# Patient Record
Sex: FEMALE | Employment: FULL TIME | URBAN - METROPOLITAN AREA
[De-identification: names, ages, dates, MRNs, and addresses within clinical notes are randomized per-mention and may not be internally consistent; named-entity substitution may affect disease eponyms.]

---

## 2018-03-06 ENCOUNTER — OFFICE VISIT (OUTPATIENT)
Dept: PODIATRY | Facility: CLINIC | Age: 58
End: 2018-03-06
Payer: COMMERCIAL

## 2018-03-06 VITALS — BODY MASS INDEX: 23.95 KG/M2 | HEIGHT: 68 IN | WEIGHT: 158 LBS | RESPIRATION RATE: 18 BRPM

## 2018-03-06 DIAGNOSIS — B35.1 ONYCHOMYCOSIS: Primary | ICD-10-CM

## 2018-03-06 PROCEDURE — 99203 OFFICE O/P NEW LOW 30 MIN: CPT | Performed by: PODIATRIST

## 2018-03-06 RX ORDER — VALSARTAN 80 MG/1
80 TABLET ORAL
COMMUNITY

## 2018-03-06 RX ORDER — DEXTROAMPHETAMINE SACCHARATE, AMPHETAMINE ASPARTATE, DEXTROAMPHETAMINE SULFATE AND AMPHETAMINE SULFATE 1.25; 1.25; 1.25; 1.25 MG/1; MG/1; MG/1; MG/1
TABLET ORAL
Refills: 0 | COMMUNITY
Start: 2017-09-29 | End: 2018-03-06

## 2018-03-06 RX ORDER — NADOLOL 20 MG/1
20 TABLET ORAL
COMMUNITY
Start: 2017-05-04

## 2018-03-06 RX ORDER — NADOLOL 20 MG/1
TABLET ORAL
Refills: 11 | COMMUNITY
Start: 2018-01-22 | End: 2018-03-06

## 2018-03-06 RX ORDER — HYDROCHLOROTHIAZIDE 25 MG/1
TABLET ORAL
COMMUNITY
Start: 2018-01-15

## 2018-03-06 RX ORDER — CIPROFLOXACIN 500 MG/1
TABLET, FILM COATED ORAL
Refills: 0 | COMMUNITY
Start: 2018-01-21 | End: 2018-03-06

## 2018-03-06 RX ORDER — TERBINAFINE HYDROCHLORIDE 250 MG/1
TABLET ORAL
COMMUNITY
Start: 2017-04-11

## 2018-03-06 RX ORDER — PANTOPRAZOLE SODIUM 40 MG/1
TABLET, DELAYED RELEASE ORAL
Refills: 3 | COMMUNITY
Start: 2018-02-13 | End: 2018-03-06

## 2018-03-06 RX ORDER — ALPRAZOLAM 0.5 MG
0.5 TABLET ORAL
COMMUNITY

## 2018-03-06 RX ORDER — VALSARTAN AND HYDROCHLOROTHIAZIDE 80; 12.5 MG/1; MG/1
TABLET, FILM COATED ORAL
Refills: 0 | COMMUNITY
Start: 2017-09-20 | End: 2018-03-06

## 2018-03-06 RX ORDER — PANTOPRAZOLE SODIUM 40 MG/1
40 TABLET, DELAYED RELEASE ORAL
COMMUNITY
Start: 2017-01-11

## 2018-03-06 RX ORDER — ESTRADIOL 0.04 MG/D
1 PATCH, EXTENDED RELEASE TRANSDERMAL
COMMUNITY
Start: 2018-02-01 | End: 2018-03-06

## 2018-03-06 RX ORDER — IVERMECTIN 10 MG/G
CREAM TOPICAL
COMMUNITY
Start: 2017-08-16

## 2018-03-06 RX ORDER — ZOLPIDEM TARTRATE 5 MG/1
TABLET ORAL
Refills: 0 | COMMUNITY
Start: 2018-01-19 | End: 2018-03-06

## 2018-03-06 RX ORDER — VALSARTAN 80 MG/1
TABLET ORAL
Refills: 3 | COMMUNITY
Start: 2018-02-15 | End: 2018-03-06

## 2018-03-06 RX ORDER — IVERMECTIN 10 MG/G
CREAM TOPICAL
Refills: 11 | COMMUNITY
Start: 2017-08-16

## 2018-03-06 RX ORDER — ZOLPIDEM TARTRATE 5 MG/1
TABLET ORAL
COMMUNITY
Start: 2017-01-06

## 2018-03-06 RX ORDER — HYDROCHLOROTHIAZIDE 25 MG/1
TABLET ORAL
Refills: 3 | COMMUNITY
Start: 2018-02-13 | End: 2018-03-06

## 2018-03-06 RX ORDER — CLINDAMYCIN PHOSPHATE 10 MG/G
GEL TOPICAL
Refills: 11 | COMMUNITY
Start: 2017-08-16 | End: 2018-03-06

## 2018-03-06 RX ORDER — CICLOPIROX 80 MG/ML
SOLUTION TOPICAL
Qty: 6.6 ML | Refills: 5 | Status: SHIPPED | OUTPATIENT
Start: 2018-03-06

## 2018-03-06 RX ORDER — ALPRAZOLAM 0.5 MG
TABLET ORAL
Refills: 0 | COMMUNITY
Start: 2018-01-19 | End: 2018-03-06

## 2018-03-06 RX ORDER — CLINDAMYCIN PHOSPHATE 10 MG/G
GEL TOPICAL
COMMUNITY
Start: 2017-08-16 | End: 2018-03-06

## 2018-03-06 RX ORDER — ESTRADIOL 0.04 MG/D
FILM, EXTENDED RELEASE TRANSDERMAL
Refills: 3 | COMMUNITY
Start: 2018-02-20

## 2018-03-06 NOTE — NURSING NOTE
"Chief Complaint   Patient presents with     Foot Problems     Bl foot nail fungus x 1.5 years, lamisil made her sick       Initial Resp 18  Ht 5' 8\" (1.727 m)  Wt 158 lb (71.7 kg)  BMI 24.02 kg/m2 Estimated body mass index is 24.02 kg/(m^2) as calculated from the following:    Height as of this encounter: 5' 8\" (1.727 m).    Weight as of this encounter: 158 lb (71.7 kg).  Medication Reconciliation: complete    Baldev Nguyen CMA (AAMA)  Podiatry / Foot & Ankle Surgery  [facility]    "

## 2018-03-06 NOTE — PATIENT INSTRUCTIONS
Thank you for choosing [facility]!    DR. NIETO'S CLINIC LOCATIONS:   MONDAY - [place] TUESDAY - [place]   [address] [facility]  [address]   [place] [address] [place] [address]   [phone]       THURSDAY AM - [place] THURSDAY PM - [place]   [address]   [place] [address] [place], [place] [address] [phone]       FRIDAY AM - [place] SET UP SURGERY: [phone] [address] APPOINTMENTS: [phone]   [place] [address] BILLING QUESTIONS: [phone] FAX NUMBER: [phone]     Follow Up: as needed    NAIL FUNGUS / ONYCHOMYCOSIS   Nail fungus is not a hygiene problem and will not likely lead to significant medical   problems. The nails may get thick causing pain and possibly local skin infection.   Treatments include debridement (trimming), oral antifungals, topical antifungals and complete removal of the nail. Most fungal nails are not treated.   Topicals such as tea tree oil can be helpful for surface fungus and may, at best, limit   progression. Over the counter creams (such as Lamisil) can also be used however, their effectiveness is also quite low.  Topical treatment with Pen lac is expensive and often not covered by insurance. Pen lac has an approximate 8% success rate. Topical therapy recommendations is to apply twice a day for at least 3-4 months as it takes 9 months for new nail to grow out.    Experts suggest soaking your feet for 15 to 20 minutes in a mixture of 1 cup vinegar to 4 cups warm water. Be sure to rinse well and pat your feet dry when you're done. You can soak your feet like this daily. But if your skin becomes irritated, try soaking only two to three times a week. Vicks VapoRub, as with vinegar, there have been no controlled clinical trials to assess the effectiveness of Vicks VapoRub on nail fungus, but there have been numerous anecdotal reports  that it works. There's no consensus on how often to apply this product, so check with your doctor before using it on your nails.     Oral therapies include Sporanox and Lamisil. Oral therapies are also expensive and not very effective. Side effects such as liver disease are the main concern. Return of fungus is common even if the treatment worked.     Other Tips:  - Penlac nail medication apply daily x 4 months; remove old polish first day of each week  - Antifungal cream/powder (Zeasorb) - apply daily to feet and shoes x 2 months  - Clean shoes with Lysol or in washing machine every few weeks  - Rotate shoe gear; give them 24 hours to dry out between days wearing them  - Clean pair of socks in morning, clean pair in afternoon if your feet sweat  - Shower shoes used in public showers/pools      Body Mass Index (BMI)  Many things can cause foot and ankle problems. Foot structure, activity level, foot mechanics and injuries are common causes of pain. One very important issue that often goes unmentioned, is body weight. Extra weight can cause increased stress on muscles, ligaments, bones and tendons. Sometimes just a few extra pounds is all it takes to put one over her/his threshold. Without reducing that stress, it can be difficult to alleviate pain. Some people are uncomfortable addressing this issue, but we feel it is important for you to think about it. As Foot &  Ankle specialists, our job is addressing the lower extremity problem and possible causes. Regarding extra body weight, we encourage patients to discuss diet and weight management plans with their primary care doctors. It is this team approach that gives you the best opportunity for pain relief and getting you back on your feet.

## 2018-03-06 NOTE — PROGRESS NOTES
"Foot & Ankle Surgery  March 6, 2018    CC: toe fungus    I was asked to see Saida MONDRAGON Tolubora regarding the chief complaint by:  self    HPI:  Pt is a 57 year old female who presents with above complaint.  Toe fungus R>L x 1 1/2 years.  \"treatments that's tolerable\".  No pain noted.  She has tried Lamisil but it mad her sick.  + culture R hallux Feb 2017 but Lamisil therapy made her nauseous.  She also had an injury to the R hallux 20-30 years ago.  She previously had good success with antifungal treatment other than side effects.    ROS:   Pos for CC.  The patient denies current nausea, vomiting, chills, fevers, belly pain, calf pain, chest pain or SOB.  Complete remainder of ROS is otherwise neg.    VITALS:    Vitals:    03/06/18 1429   Resp: 18   Weight: 158 lb (71.7 kg)   Height: 5' 8\" (1.727 m)       PMH:  No past medical history on file.    SXHX:  No past surgical history on file.     MEDS:    Current Outpatient Prescriptions   Medication     SOOLANTRA 1 % cream     MINIVELLE 0.0375 MG/24HR BIW patch     zolpidem (AMBIEN) 5 MG tablet     valsartan (DIOVAN) 80 MG tablet     pantoprazole (PROTONIX) 40 MG EC tablet     hydrochlorothiazide (HYDRODIURIL) 25 MG tablet     nadolol (CORGARD) 20 MG tablet     ivermectin (SOOLANTRA) 1 % cream     ALPRAZolam (XANAX) 0.5 MG tablet     terbinafine (LAMISIL) 250 MG tablet     [DISCONTINUED] valsartan (DIOVAN) 80 MG tablet     [DISCONTINUED] nadolol (CORGARD) 20 MG tablet     [DISCONTINUED] hydrochlorothiazide (HYDRODIURIL) 25 MG tablet     [DISCONTINUED] estradiol (VIVELLE-DOT) 0.0375 MG/24HR BIW patch     No current facility-administered medications for this visit.        ALL:   Not on File    FMH:  No family history on file.    SocHx:    Social History     Social History     Marital status:      Spouse name: N/A     Number of children: N/A     Years of education: N/A     Occupational History     Not on file.     Social History Main Topics     Smoking status: Unknown If " Ever Smoked     Smokeless tobacco: Never Used     Alcohol use Not on file     Drug use: Not on file     Sexual activity: Not on file     Other Topics Concern     Not on file     Social History Narrative     No narrative on file           EXAMINATION:  Gen:   No apparent distress  Neuro:   A&Ox3, no deficits  Psych:    Answering questions appropriately for age and situation with normal affect  Head:    NCAT  Eye:    Visual scanning without deficit  Ear:    Response to auditory stimuli wnl  Lung:    Non-labored breathing on RA noted  Abd:    NTND per patient report  Lymph:    Neg for pitting/non-pitting edema BLE  Vasc:    Pulses palpable, CFT minimally delayed  Neuro:    Light touch sensation intact to all sensory nerve distributions without paresthesias  Derm:    Hallux nail bilateral R>L are dystrophic, mycotic.  No tinea pedis noted.  MSK:    ROM, strength wnl without limitation, no pain on palpation noted.  Calf:    Neg for redness, swelling or tenderness    Assessment:  57 year old female with onychomycosis hallux nail bilateral       Plan:  Discussed etiologies, anatomy and options  1.  Onychomycosis hallux nail bilateral   -Rx for Penlac with instructions  -home fungus handout dispensed and discussed   -discussed that nail fungus is difficult to treat and eradicate.  Treating fungus at multiple locations, from nails to skin to shoes, etc, will increase chance of successful treatment.    Follow up:  prn or sooner with acute issues      Patient's medical history was reviewed today    Body mass index is 24.02 kg/(m^2).          Christian Dorado DPM   Podiatric Foot & Ankle Surgeon  [facility]  [phone]

## 2018-03-06 NOTE — MR AVS SNAPSHOT
After Visit Summary   3/6/2018    Saida Wilkinson    MRN: [id number]           Patient Information     Date Of Birth          1960        Visit Information        Provider Department      3/6/2018 2:30 PM Christian Nieto DPM [facility]        Today's Diagnoses     Onychomycosis    -  1      Care Instructions    Thank you for choosing [facility]!    DR. NIETO'S CLINIC LOCATIONS:   MONDAY - [place] TUESDAY - [place]   [address] [facility]  [address]   [place] [address] [place] [address]   [phone]       THURSDAY AM - [place] THURSDAY PM - [place]   [address]   [place] [address] [place], [place] [address] [phone]       FRIDAY AM - [place] SET UP SURGERY: [phone] [address] APPOINTMENTS: [phone]   [place] [address] BILLING QUESTIONS: [phone] FAX NUMBER: [phone]     Follow Up: as needed    NAIL FUNGUS / ONYCHOMYCOSIS   Nail fungus is not a hygiene problem and will not likely lead to significant medical   problems. The nails may get thick causing pain and possibly local skin infection.   Treatments include debridement (trimming), oral antifungals, topical antifungals and complete removal of the nail. Most fungal nails are not treated.   Topicals such as tea tree oil can be helpful for surface fungus and may, at best, limit   progression. Over the counter creams (such as Lamisil) can also be used however, their effectiveness is also quite low.  Topical treatment with Pen lac is expensive and often not covered by insurance. Pen lac has an approximate 8% success rate. Topical therapy recommendations is to apply twice a day for at least 3-4 months as it takes 9 months for new nail to grow out.    Experts suggest soaking your feet for 15 to 20 minutes in a mixture of 1 cup vinegar to 4 cups warm water. Be sure to rinse  well and pat your feet dry when you're done. You can soak your feet like this daily. But if your skin becomes irritated, try soaking only two to three times a week. Vicks VapoRub, as with vinegar, there have been no controlled clinical trials to assess the effectiveness of Vicks VapoRub on nail fungus, but there have been numerous anecdotal reports that it works. There's no consensus on how often to apply this product, so check with your doctor before using it on your nails.     Oral therapies include Sporanox and Lamisil. Oral therapies are also expensive and not very effective. Side effects such as liver disease are the main concern. Return of fungus is common even if the treatment worked.     Other Tips:  - Penlac nail medication apply daily x 4 months; remove old polish first day of each week  - Antifungal cream/powder (Zeasorb) - apply daily to feet and shoes x 2 months  - Clean shoes with Lysol or in washing machine every few weeks  - Rotate shoe gear; give them 24 hours to dry out between days wearing them  - Clean pair of socks in morning, clean pair in afternoon if your feet sweat  - Shower shoes used in public showers/pools      Body Mass Index (BMI)  Many things can cause foot and ankle problems. Foot structure, activity level, foot mechanics and injuries are common causes of pain. One very important issue that often goes unmentioned, is body weight. Extra weight can cause increased stress on muscles, ligaments, bones and tendons. Sometimes just a few extra pounds is all it takes to put one over her/his threshold. Without reducing that stress, it can be difficult to alleviate pain. Some people are uncomfortable addressing this issue, but we feel it is important for you to think about it. As Foot &  Ankle specialists, our job is addressing the lower extremity problem and possible causes. Regarding extra body weight, we encourage patients to discuss diet and weight management plans with their primary care  "doctors. It is this team approach that gives you the best opportunity for pain relief and getting you back on your feet.            Follow-ups after your visit        Who to contact     If you have questions or need follow up information about today's clinic visit or your schedule please contact [facility] directly at [phone].  Normal or non-critical lab and imaging results will be communicated to you by MyChart, letter or phone within 4 business days after the clinic has received the results. If you do not hear from us within 7 days, please contact the clinic through [employer] or phone. If you have a critical or abnormal lab result, we will notify you by phone as soon as possible.  Submit refill requests through [employer] or call your pharmacy and they will forward the refill request to us. Please allow 3 business days for your refill to be completed.          Additional Information About Your Visit        [employer] Information     [employer] lets you send messages to your doctor, view your test results, renew your prescriptions, schedule appointments and more. To sign up, go to [email or web address] . Click on \"Log in\" on the left side of the screen, which will take you to the Welcome page. Then click on \"Sign up Now\" on the right side of the page.     You will be asked to enter the access code listed below, as well as some personal information. Please follow the directions to create your username and password.     Your access code is: [email or web address]  Expires: 2018  2:50 PM     Your access code will  in 90 days. If you need help or a new code, please call your [facility] clinic or [phone].        Care EveryWhere ID     This is your Care EveryWhere ID. This could be used by other organizations to access your [facility] medical records  [id number]        Your Vitals Were     Respirations Height BMI (Body Mass Index)             18 5' 8\" (1.727 m) 24.02 kg/m2          Blood Pressure from Last 3 " Encounters:   No data found for BP    Weight from Last 3 Encounters:   03/06/18 158 lb (71.7 kg)              Today, you had the following     No orders found for display         Today's Medication Changes          These changes are accurate as of 3/6/18  2:50 PM.  If you have any questions, ask your nurse or doctor.               Start taking these medicines.        Dose/Directions    ciclopirox 8 % Soln   Used for:  Onychomycosis   Started by:  Christian Dorado DPM        Apply to involved nails daily x 12 months; remove old medication and file nails once weekly.   Quantity:  6.6 mL   Refills:  5         These medicines have changed or have updated prescriptions.        Dose/Directions    ALPRAZolam 0.5 MG tablet   Commonly known as:  XANAX   This may have changed:  Another medication with the same name was removed. Continue taking this medication, and follow the directions you see here.   Changed by:  Christian Dorado DPM        Dose:  0.5 mg   Take 0.5 mg by mouth   Refills:  0       hydrochlorothiazide 25 MG tablet   Commonly known as:  HYDRODIURIL   This may have changed:  Another medication with the same name was removed. Continue taking this medication, and follow the directions you see here.   Changed by:  Christian Dorado DPM        TK 1 T PO QD   Refills:  0       MINIVELLE 0.0375 MG/24HR BIW patch   This may have changed:  Another medication with the same name was removed. Continue taking this medication, and follow the directions you see here.   Generic drug:  estradiol   Changed by:  Christian Dorado DPM        LONA 1 PA EXT TO THE SKIN 2 TIMES A WK   Refills:  3       nadolol 20 MG tablet   Commonly known as:  CORGARD   This may have changed:  Another medication with the same name was removed. Continue taking this medication, and follow the directions you see here.   Changed by:  Christian Dorado DPM        Dose:  20 mg   Take 20 mg by mouth   Refills:  0       pantoprazole 40 MG EC  tablet   Commonly known as:  PROTONIX   This may have changed:  Another medication with the same name was removed. Continue taking this medication, and follow the directions you see here.   Changed by:  Christian Dorado DPM        Dose:  40 mg   Take 40 mg by mouth   Refills:  0       valsartan 80 MG tablet   Commonly known as:  DIOVAN   This may have changed:  Another medication with the same name was removed. Continue taking this medication, and follow the directions you see here.   Changed by:  Christian Dorado DPM        Dose:  80 mg   Take 80 mg by mouth   Refills:  0       zolpidem 5 MG tablet   Commonly known as:  AMBIEN   This may have changed:  Another medication with the same name was removed. Continue taking this medication, and follow the directions you see here.   Changed by:  Chirstian Dorado DPM        TK 1 T PO QHS   Refills:  0         Stop taking these medicines if you haven't already. Please contact your care team if you have questions.     amphetamine-dextroamphetamine 5 MG per tablet   Commonly known as:  ADDERALL   Stopped by:  Christian Dorado DPM           ciprofloxacin 500 MG tablet   Commonly known as:  CIPRO   Stopped by:  Christian Dorado DPM           clindamycin 1 % topical gel   Commonly known as:  CLINDAMAX   Stopped by:  Christian Dorado DPM           valsartan-hydrochlorothiazide 80-12.5 MG per tablet   Commonly known as:  DIOVAN-HCT   Stopped by:  Christian Dorado DPM                Where to get your medicines      These medications were sent to [facility] [address] [place] [address] [address] [facility] AT [facility] [address] [facility] [address]     Phone:  [phone]     ciclopirox 8 % Soln                Primary Care Provider Fax #    Physician No Ref-Primary [phone]       No address on file        Equal Access to Services     SHANE LLOYD AH: Margie Funes, vasquez mejia, qazane kacosme ballard, jazmyn bhatia  abner ocasio ah. So [facility] [phone].    ATENCIÓN: Si bobla monique, tiene a anderson disposición servicios gratuitos de asistencia lingüística. Ruslan rendon [phone].    We comply with applicable federal civil rights laws and [place] laws. We do not discriminate on the basis of race, color, national origin, age, disability, sex, sexual orientation, or gender identity.            Thank you!     Thank you for choosing [facility]  for your care. Our goal is always to provide you with excellent care. Hearing back from our patients is one way we can continue to improve our services. Please take a few minutes to complete the written survey that you may receive in the mail after your visit with us. Thank you!             Your Updated Medication List - Protect others around you: Learn how to safely use, store and throw away your medicines at www.disposemymeds.org.          This list is accurate as of 3/6/18  2:50 PM.  Always use your most recent med list.                   Brand Name Dispense Instructions for use Diagnosis    ALPRAZolam 0.5 MG tablet    XANAX     Take 0.5 mg by mouth        ciclopirox 8 % Soln     6.6 mL    Apply to involved nails daily x 12 months; remove old medication and file nails once weekly.    Onychomycosis       hydrochlorothiazide 25 MG tablet    HYDRODIURIL     TK 1 T PO QD        MINIVELLE 0.0375 MG/24HR BIW patch   Generic drug:  estradiol      LONA 1 PA EXT TO THE SKIN 2 TIMES A WK        nadolol 20 MG tablet    CORGARD     Take 20 mg by mouth        pantoprazole 40 MG EC tablet    PROTONIX     Take 40 mg by mouth        * SOOLANTRA 1 % cream   Generic drug:  ivermectin      LONA PEA SIZED AMT EXT AA D        * ivermectin 1 % cream    SOOLANTRA          terbinafine 250 MG tablet    lamISIL          valsartan 80 MG tablet    DIOVAN     Take 80 mg by mouth        zolpidem 5 MG tablet    AMBIEN     TK 1 T PO QHS        * Notice:  This list has 2 medication(s) that are the  same as other medications prescribed for you. Read the directions carefully, and ask your doctor or other care provider to review them with you.

## 2019-11-04 ENCOUNTER — HOSPITAL ENCOUNTER (EMERGENCY)
Facility: CLINIC | Age: 59
Discharge: HOME | End: 2019-11-04
Attending: EMERGENCY MEDICINE | Admitting: EMERGENCY MEDICINE
Payer: COMMERCIAL

## 2019-11-04 ENCOUNTER — APPOINTMENT (OUTPATIENT)
Dept: CT IMAGING | Facility: CLINIC | Age: 59
End: 2019-11-04
Attending: EMERGENCY MEDICINE
Payer: COMMERCIAL

## 2019-11-04 VITALS
BODY MASS INDEX: 24.25 KG/M2 | WEIGHT: 160 LBS | TEMPERATURE: 97.6 F | HEART RATE: 72 BPM | SYSTOLIC BLOOD PRESSURE: 120 MMHG | RESPIRATION RATE: 18 BRPM | HEIGHT: 68 IN | DIASTOLIC BLOOD PRESSURE: 69 MMHG | OXYGEN SATURATION: 100 %

## 2019-11-04 DIAGNOSIS — R19.7 ACUTE DIARRHEA: ICD-10-CM

## 2019-11-04 LAB
ANION GAP SERPL CALCULATED.3IONS-SCNC: 5 MMOL/L (ref 3–14)
BASOPHILS # BLD AUTO: 0.1 10E9/L (ref 0–0.2)
BASOPHILS NFR BLD AUTO: 0.5 %
BUN SERPL-MCNC: 13 MG/DL (ref 7–30)
CALCIUM SERPL-MCNC: 8.7 MG/DL (ref 8.5–10.1)
CHLORIDE SERPL-SCNC: 104 MMOL/L (ref 94–109)
CO2 SERPL-SCNC: 28 MMOL/L (ref 20–32)
CREAT SERPL-MCNC: 0.94 MG/DL (ref 0.52–1.04)
DIFFERENTIAL METHOD BLD: NORMAL
EOSINOPHIL # BLD AUTO: 0.3 10E9/L (ref 0–0.7)
EOSINOPHIL NFR BLD AUTO: 3.2 %
ERYTHROCYTE [DISTWIDTH] IN BLOOD BY AUTOMATED COUNT: 13.1 % (ref 10–15)
GFR SERPL CREATININE-BSD FRML MDRD: 65 ML/MIN/{1.73_M2}
GLUCOSE SERPL-MCNC: 120 MG/DL (ref 70–99)
HCT VFR BLD AUTO: 37.9 % (ref 35–47)
HGB BLD-MCNC: 12.6 G/DL (ref 11.7–15.7)
IMM GRANULOCYTES # BLD: 0 10E9/L (ref 0–0.4)
IMM GRANULOCYTES NFR BLD: 0.4 %
LYMPHOCYTES # BLD AUTO: 1.1 10E9/L (ref 0.8–5.3)
LYMPHOCYTES NFR BLD AUTO: 10.6 %
MCH RBC QN AUTO: 31 PG (ref 26.5–33)
MCHC RBC AUTO-ENTMCNC: 33.2 G/DL (ref 31.5–36.5)
MCV RBC AUTO: 93 FL (ref 78–100)
MONOCYTES # BLD AUTO: 0.8 10E9/L (ref 0–1.3)
MONOCYTES NFR BLD AUTO: 7.2 %
NEUTROPHILS # BLD AUTO: 8.3 10E9/L (ref 1.6–8.3)
NEUTROPHILS NFR BLD AUTO: 78.1 %
NRBC # BLD AUTO: 0 10*3/UL
NRBC BLD AUTO-RTO: 0 /100
PLATELET # BLD AUTO: 239 10E9/L (ref 150–450)
POTASSIUM SERPL-SCNC: 3.7 MMOL/L (ref 3.4–5.3)
RBC # BLD AUTO: 4.07 10E12/L (ref 3.8–5.2)
SODIUM SERPL-SCNC: 137 MMOL/L (ref 133–144)
WBC # BLD AUTO: 10.7 10E9/L (ref 4–11)

## 2019-11-04 PROCEDURE — 96360 HYDRATION IV INFUSION INIT: CPT | Mod: 59

## 2019-11-04 PROCEDURE — 25800030 ZZH RX IP 258 OP 636: Performed by: EMERGENCY MEDICINE

## 2019-11-04 PROCEDURE — 80048 BASIC METABOLIC PNL TOTAL CA: CPT | Performed by: EMERGENCY MEDICINE

## 2019-11-04 PROCEDURE — 74177 CT ABD & PELVIS W/CONTRAST: CPT

## 2019-11-04 PROCEDURE — 25000128 H RX IP 250 OP 636: Performed by: EMERGENCY MEDICINE

## 2019-11-04 PROCEDURE — 25000125 ZZHC RX 250: Performed by: EMERGENCY MEDICINE

## 2019-11-04 PROCEDURE — 85025 COMPLETE CBC W/AUTO DIFF WBC: CPT | Performed by: EMERGENCY MEDICINE

## 2019-11-04 PROCEDURE — 99285 EMERGENCY DEPT VISIT HI MDM: CPT | Mod: 25

## 2019-11-04 RX ORDER — SODIUM CHLORIDE 9 MG/ML
1000 INJECTION, SOLUTION INTRAVENOUS CONTINUOUS
Status: DISCONTINUED | OUTPATIENT
Start: 2019-11-04 | End: 2019-11-05 | Stop reason: HOSPADM

## 2019-11-04 RX ORDER — IOPAMIDOL 755 MG/ML
500 INJECTION, SOLUTION INTRAVASCULAR ONCE
Status: COMPLETED | OUTPATIENT
Start: 2019-11-04 | End: 2019-11-04

## 2019-11-04 RX ADMIN — SODIUM CHLORIDE 1000 ML: 9 INJECTION, SOLUTION INTRAVENOUS at 20:28

## 2019-11-04 RX ADMIN — IOPAMIDOL 81 ML: 755 INJECTION, SOLUTION INTRAVENOUS at 22:01

## 2019-11-04 RX ADMIN — SODIUM CHLORIDE 60 ML: 9 INJECTION, SOLUTION INTRAVENOUS at 22:02

## 2019-11-04 ASSESSMENT — MIFFLIN-ST. JEOR: SCORE: 1344.26

## 2019-11-04 ASSESSMENT — ENCOUNTER SYMPTOMS
DIAPHORESIS: 1
ABDOMINAL PAIN: 1
FEVER: 0
BLOOD IN STOOL: 1
DIARRHEA: 1
RECTAL PAIN: 0
NAUSEA: 0

## 2019-11-04 NOTE — ED AVS SNAPSHOT
[facility] Emergency Department  [address]  [facility] [address]  Phone:  [phone]  Fax:  [phone]                                    Saida Wilkinson   MRN: [id number]    Department:  [facility] Emergency Department   Date of Visit:  11/4/2019           After Visit Summary Signature Page    I have received my discharge instructions, and my questions have been answered. I have discussed any challenges I see with this plan with the nurse or doctor.    ..........................................................................................................................................  Patient/Patient Representative Signature      ..........................................................................................................................................  Patient Representative Print Name and Relationship to Patient    ..................................................               ................................................  Date                                   Time    ..........................................................................................................................................  Reviewed by Signature/Title    ...................................................              ..............................................  Date                                               Time          22EPIC Rev 08/18

## 2019-11-05 NOTE — ED TRIAGE NOTES
Patient presents with abdominal pain with cramping and diarrhea since 0800 this morning. Patient states now she is having bright red bleeding with stools. ABCDs intact, alert and oriented x 4.

## 2019-11-05 NOTE — ED NOTES
Pt provided with discharge paperwork and educated on recommended follow-up with PCP as needed. Pt educated on how to manage symptoms at home and when to seek medical attention. Pt voiced understanding and denied any questions at discharge.

## 2019-11-05 NOTE — ED PROVIDER NOTES
History     Chief Complaint:  Abdominal pain    HPI   Saida Wilkinson is a 59 year old female, with a history of HTN and hemorrhoids, who presents with her  to the emergency department for evaluation of abdominal pain. The patient reports she was at work this afternoon when she started experiencing a cramping low abdominal pain that was intermittent until later in the day when she experienced diaphoresis and an episode of liquid diarrhea. She notes the abdominal pain worsened to a 10/10 and she had another episode of diarrhea with blood. She notes she had a bowel movement at 1720 of bright red blood with clots and no stool. She notes her abdominal pain is constant now but waxes and wanes in severity from a 4/10 cramp to a severe 10/10 pain. She denies any fever, nausea, or rectal pain. She does note she took Pepto Bismol at 1730 tonight. She reports a normal colonoscopy ten years prior to evaluation.    Allergies:  No known drug allergies     Medications:    Xanax  Hydrochlorothiazide  Minivelle  Corgard  Protonix  Terbinafine  Valsartan  Ambien    Past Medical History:    Varicose veins  Chronic insomnia  HTN  GERD  Anxiety  Arthritis    Past Surgical History:    Urethra dilation   section  Plantar fascia surgery  Radial nerve decompression x2  Cervical fusion  Cosmetic surgery  Tonsillectomy  Hysterectomy    Family History:    Family history limited secondary to patient's adoption.    Social History:  Smoking status: Never  Alcohol use: Yes  The patient presents to the emergency department with her .  Marital Status:   [2]     Review of Systems   Constitutional: Positive for diaphoresis. Negative for fever.   Gastrointestinal: Positive for abdominal pain, blood in stool and diarrhea. Negative for nausea and rectal pain.   All other systems reviewed and are negative.    Physical Exam   Patient Vitals for the past 24 hrs:   BP Temp Temp src Pulse Heart Rate Resp SpO2 Height Weight  "  11/04/19 2115 120/69 -- -- -- -- -- 100 % -- --   11/04/19 2100 126/73 -- -- 72 -- -- 100 % -- --   11/04/19 2045 122/78 -- -- -- -- -- 100 % -- --   11/04/19 2030 128/76 -- -- 75 -- -- 100 % -- --   11/04/19 1857 (!) 141/91 97.6  F (36.4  C) Temporal -- 80 18 98 % 1.727 m (5' 8\") 72.6 kg (160 lb)     Physical Exam  General: Patient is alert and cooperative. Well appearing.   HENT:  Normal nose, oropharynx. Moist oral mucosa.  Eyes: EOMI. Normal conjunctiva.  Neck:  Normal range of motion and appearance.   Cardiovascular:  Normal rate, regular rhythm and normal heart sounds.   Pulmonary/Chest:  Effort normal. No wheezing or crackles.  Abdominal: Soft. No distension; mild lower abdominal tenderness without guarding.   Musculoskeletal: Normal range of motion. No edema or tenderness.   Neurological: oriented, normal strength, sensation, and coordination.   Skin: Warm and dry. No rash or bruising.   Psychiatric: Normal mood and affect. Normal behavior and judgement.      Emergency Department Course   Imaging:  Radiographic findings were communicated with the patient and family who voiced understanding of the findings.    CT Abdomen Pelvis w Contrast  IMPRESSION: No cause of acute pain identified in the abdomen or pelvis. Normal appendix.  As read by Radiology.    Laboratory:  CBC: AWNL (WBC 10.7, HGB 12.6, )  BMP: Glucose 120 (H) o/w WNL (Creatinine 0.94)    Interventions:  2028 NS 1L IV Bolus    Emergency Department Course:  Past medical records, nursing notes, and vitals reviewed.  2012: I performed an exam of the patient and obtained history, as documented above.    IV inserted and blood drawn.    2250: I rechecked the patient. Findings and plan explained to the Patient and spouse. Patient discharged home with instructions regarding supportive care, medications, and reasons to return. The importance of close follow-up was reviewed.   Impression & Plan    Medical Decision Making:  Afebrile 59-year-old female " with acute diarrhea.  Initially nonbloody but eventual development of reported bright red blood.  She is had some abdominal cramping discomfort as well but no fevers.  There is no recent foreign travel history or ill contacts.  She is not anticoagulated.  She has no known history of any gastrointestinal pathology.  Screening colonoscopy 10 years ago showed no significant abnormalities.  She is well-appearing, hemodynamically stable, with a benign abdominal examination.  Work-up is been reassuring.  This is included normal laboratory tests.  White blood cell count is 10.7, hemoglobin 12.6, platelets 239.  Normal renal function.  CT abdomen pelvis is normal.  There is no evidence of colitis or mass or other intra-abdominal inflammatory process of any sort.  In light of this I suspect that this is an infectious etiology.  Empiric antibiotics are not warranted.  I recommended recheck through clinic later this week unless symptoms have fully resolved.    Diagnosis:    ICD-10-CM   1. Acute diarrhea R19.7     Disposition:  Discharged to home with instructions to follow up with gastroenterology.  Viry Rubio  11/4/2019   [facility] EMERGENCY DEPARTMENT  Scribe Disclosure:  I, Viry Rubio, am serving as a scribe at 8:12 PM on 11/4/2019 to document services personally performed by Demertio South MD based on my observations and the provider's statements to me.      Demetrio South MD  11/05/19 [id number]